# Patient Record
Sex: FEMALE | Race: WHITE | Employment: FULL TIME | URBAN - METROPOLITAN AREA
[De-identification: names, ages, dates, MRNs, and addresses within clinical notes are randomized per-mention and may not be internally consistent; named-entity substitution may affect disease eponyms.]

---

## 2017-04-13 ENCOUNTER — HOSPITAL ENCOUNTER (OUTPATIENT)
Age: 47
Setting detail: SPECIMEN
Discharge: HOME | End: 2017-04-13
Payer: COMMERCIAL

## 2017-04-13 ENCOUNTER — OFFICE VISIT (OUTPATIENT)
Dept: OBGYN CLINIC | Age: 47
End: 2017-04-13
Payer: COMMERCIAL

## 2017-04-13 VITALS
SYSTOLIC BLOOD PRESSURE: 116 MMHG | DIASTOLIC BLOOD PRESSURE: 82 MMHG | HEIGHT: 63 IN | WEIGHT: 232.4 LBS | BODY MASS INDEX: 41.18 KG/M2

## 2017-04-13 DIAGNOSIS — Z11.3 ROUTINE SCREENING FOR STI (SEXUALLY TRANSMITTED INFECTION): Primary | ICD-10-CM

## 2017-04-13 DIAGNOSIS — R10.2 PELVIC PAIN IN FEMALE: ICD-10-CM

## 2017-04-13 DIAGNOSIS — N76.0 ACUTE VAGINITIS: ICD-10-CM

## 2017-04-13 LAB
DIRECT EXAM: ABNORMAL
Lab: ABNORMAL
SPECIMEN DESCRIPTION: ABNORMAL
STATUS: ABNORMAL

## 2017-04-13 PROCEDURE — 99203 OFFICE O/P NEW LOW 30 MIN: CPT | Performed by: NURSE PRACTITIONER

## 2017-04-14 LAB
-: ABNORMAL
AMORPHOUS: ABNORMAL
BACTERIA: ABNORMAL
BILIRUBIN URINE: NEGATIVE
C TRACH DNA GENITAL QL NAA+PROBE: NEGATIVE
CASTS UA: ABNORMAL /LPF (ref 0–2)
COLOR: YELLOW
COMMENT UA: ABNORMAL
CRYSTALS, UA: ABNORMAL /HPF
EPITHELIAL CELLS UA: ABNORMAL /HPF (ref 0–5)
GLUCOSE URINE: NEGATIVE
KETONES, URINE: NEGATIVE
LEUKOCYTE ESTERASE, URINE: ABNORMAL
MUCUS: ABNORMAL
N. GONORRHOEAE DNA: NEGATIVE
NITRITE, URINE: NEGATIVE
OTHER OBSERVATIONS UA: ABNORMAL
PH UA: 6 (ref 5–8)
PROTEIN UA: NEGATIVE
RBC UA: ABNORMAL /HPF (ref 0–2)
RENAL EPITHELIAL, UA: ABNORMAL /HPF
SPECIFIC GRAVITY UA: 1.02 (ref 1–1.03)
TRICHOMONAS: ABNORMAL
TURBIDITY: ABNORMAL
URINE HGB: ABNORMAL
UROBILINOGEN, URINE: NORMAL
WBC UA: ABNORMAL /HPF (ref 0–5)
YEAST: ABNORMAL

## 2017-04-15 LAB
CULTURE: NORMAL
CULTURE: NORMAL
Lab: NORMAL
SPECIMEN DESCRIPTION: NORMAL
STATUS: NORMAL

## 2017-04-17 RX ORDER — METRONIDAZOLE 500 MG/1
500 TABLET ORAL 2 TIMES DAILY
Qty: 20 TABLET | Refills: 0 | Status: SHIPPED | OUTPATIENT
Start: 2017-04-17 | End: 2017-04-18 | Stop reason: SDUPTHER

## 2017-04-17 RX ORDER — CIPROFLOXACIN 500 MG/1
500 TABLET, FILM COATED ORAL 2 TIMES DAILY
Qty: 14 TABLET | Refills: 0 | Status: SHIPPED | OUTPATIENT
Start: 2017-04-17 | End: 2017-04-18 | Stop reason: SDUPTHER

## 2017-04-18 ENCOUNTER — TELEPHONE (OUTPATIENT)
Dept: OBGYN CLINIC | Age: 47
End: 2017-04-18

## 2017-04-18 RX ORDER — CIPROFLOXACIN 500 MG/1
500 TABLET, FILM COATED ORAL 2 TIMES DAILY
Qty: 14 TABLET | Refills: 0 | Status: SHIPPED | OUTPATIENT
Start: 2017-04-18 | End: 2017-04-25

## 2017-04-18 RX ORDER — METRONIDAZOLE 500 MG/1
500 TABLET ORAL 2 TIMES DAILY
Qty: 20 TABLET | Refills: 0 | Status: SHIPPED | OUTPATIENT
Start: 2017-04-18 | End: 2017-04-28

## 2017-04-18 NOTE — TELEPHONE ENCOUNTER
Pt called and is out of town and would like the Flagyl and Cipro called to [address] in [place]. The phone number is in her chart.

## 2017-05-19 ENCOUNTER — OFFICE VISIT (OUTPATIENT)
Dept: OBGYN CLINIC | Age: 47
End: 2017-05-19
Payer: COMMERCIAL

## 2017-05-19 ENCOUNTER — HOSPITAL ENCOUNTER (OUTPATIENT)
Age: 47
Setting detail: SPECIMEN
Discharge: HOME | End: 2017-05-19
Payer: COMMERCIAL

## 2017-05-19 VITALS
WEIGHT: 228.4 LBS | DIASTOLIC BLOOD PRESSURE: 72 MMHG | BODY MASS INDEX: 40.47 KG/M2 | HEIGHT: 63 IN | SYSTOLIC BLOOD PRESSURE: 102 MMHG

## 2017-05-19 DIAGNOSIS — Z12.31 ENCOUNTER FOR SCREENING MAMMOGRAM FOR BREAST CANCER: ICD-10-CM

## 2017-05-19 DIAGNOSIS — Z01.419 ENCOUNTER FOR GYNECOLOGICAL EXAMINATION WITHOUT ABNORMAL FINDING: Primary | ICD-10-CM

## 2017-05-19 PROCEDURE — 99396 PREV VISIT EST AGE 40-64: CPT | Performed by: NURSE PRACTITIONER

## 2017-05-19 ASSESSMENT — ENCOUNTER SYMPTOMS
DIARRHEA: 0
CONSTIPATION: 0
BACK PAIN: 0
ABDOMINAL PAIN: 0
ABDOMINAL DISTENTION: 0
SHORTNESS OF BREATH: 0
COUGH: 0

## 2017-05-25 ENCOUNTER — EMPLOYEE WELLNESS (OUTPATIENT)
Dept: OTHER | Age: 47
End: 2017-05-25

## 2017-05-25 LAB
CHOLESTEROL/HDL RATIO: 2.6
CHOLESTEROL: 179 MG/DL
GLUCOSE BLD-MCNC: 99 MG/DL (ref 70–99)
HDLC SERPL-MCNC: 69 MG/DL
LDL CHOLESTEROL: 87 MG/DL (ref 0–130)
PATIENT FASTING?: YES
TRIGL SERPL-MCNC: 117 MG/DL
VLDLC SERPL CALC-MCNC: NORMAL MG/DL (ref 1–30)

## 2017-05-26 LAB — CYTOLOGY REPORT: NORMAL

## 2017-06-13 ENCOUNTER — HOSPITAL ENCOUNTER (OUTPATIENT)
Dept: MAMMOGRAPHY | Age: 47
Discharge: HOME | End: 2017-06-13
Payer: COMMERCIAL

## 2017-06-13 DIAGNOSIS — Z12.31 ENCOUNTER FOR SCREENING MAMMOGRAM FOR BREAST CANCER: ICD-10-CM

## 2017-06-13 PROCEDURE — G0202 SCR MAMMO BI INCL CAD: HCPCS

## 2017-12-12 ENCOUNTER — PATIENT MESSAGE (OUTPATIENT)
Dept: OBGYN CLINIC | Age: 47
End: 2017-12-12

## 2017-12-12 NOTE — TELEPHONE ENCOUNTER
From: Leigh Waller  To: Sam Huston CNP  Sent: 12/12/2017 3:43 PM EST  Subject: Non-Urgent Medical Question    I need to have my Mirena removed and/or replaced by 1/31/2018. according to this there are no appts available even until the end of February. Is this true? I need an appt as late in the day as possible. thank you.

## 2018-01-29 ENCOUNTER — PROCEDURE VISIT (OUTPATIENT)
Dept: OBGYN CLINIC | Age: 48
End: 2018-01-29
Payer: COMMERCIAL

## 2018-01-29 ENCOUNTER — HOSPITAL ENCOUNTER (OUTPATIENT)
Age: 48
Setting detail: SPECIMEN
Discharge: HOME | End: 2018-01-29
Payer: COMMERCIAL

## 2018-01-29 VITALS
SYSTOLIC BLOOD PRESSURE: 108 MMHG | HEIGHT: 63 IN | DIASTOLIC BLOOD PRESSURE: 80 MMHG | BODY MASS INDEX: 41.25 KG/M2 | WEIGHT: 232.8 LBS

## 2018-01-29 DIAGNOSIS — Z11.3 ROUTINE SCREENING FOR STI (SEXUALLY TRANSMITTED INFECTION): ICD-10-CM

## 2018-01-29 DIAGNOSIS — Z30.433 ENCOUNTER FOR REMOVAL AND REINSERTION OF INTRAUTERINE CONTRACEPTIVE DEVICE (IUD): Primary | ICD-10-CM

## 2018-01-29 PROCEDURE — 58300 INSERT INTRAUTERINE DEVICE: CPT | Performed by: NURSE PRACTITIONER

## 2018-01-29 PROCEDURE — 58301 REMOVE INTRAUTERINE DEVICE: CPT | Performed by: NURSE PRACTITIONER

## 2018-01-29 NOTE — PROGRESS NOTES
betadine and the string was grasped with a ring forcep and the IUD was removed without incident. Post procedure restrictions were reviewed and given to the patient. She was instructed to use barrier protection for sexually transmitted disease prevention. ASSESSMENT:  1. Encounter for removal and reinsertion of intrauterine contraceptive device (IUD)  NC INSERT INTRAUTERINE DEVICE    NC REMOVE INTRAUTERINE DEVICE    levonorgestrel (MIRENA) IUD 52 mg 1 each   2. Routine screening for STI (sexually transmitted infection)  C. Trachomatis / N. Gonorrhoeae, DNA     IUD Removal  Family Planning   reports that she has never smoked. She has never used smokeless tobacco.  There are no active problems to display for this patient. She requests that the Mirena be replaced today for hx of AUB    Bertram Wong is here for placement of Mirena IUD. She is Not on menses. Reviewed the insertion procedure and expected bleeding patterns. Consent signed. Questions answered. O. Vulva no lesions. Vagina with menses. Cultures collected for GC/Chlamydia. Vaginal vault and cervix cleansed with Betadine. Cervix significantly retroverted. Cervix grasped with tenaculum and Mirena inserted without difficulty and a minimum of cramping and bleeding. String was trimmed to 1 1/2 inches  A.  Mirena insertion  P. RTC One month for follow-up  Instructed to call for:  Unexpected pain  Increased or unexpected bleeding  Fever

## 2018-01-30 LAB
C TRACH DNA GENITAL QL NAA+PROBE: NEGATIVE
N. GONORRHOEAE DNA: NEGATIVE

## 2018-03-01 ENCOUNTER — OFFICE VISIT (OUTPATIENT)
Dept: OBGYN CLINIC | Age: 48
End: 2018-03-01

## 2018-03-01 VITALS
SYSTOLIC BLOOD PRESSURE: 104 MMHG | BODY MASS INDEX: 40.96 KG/M2 | WEIGHT: 231.2 LBS | HEIGHT: 63 IN | DIASTOLIC BLOOD PRESSURE: 84 MMHG

## 2018-03-01 DIAGNOSIS — Z97.5 IUD (INTRAUTERINE DEVICE) IN PLACE: Primary | ICD-10-CM

## 2018-03-01 PROCEDURE — 99024 POSTOP FOLLOW-UP VISIT: CPT | Performed by: NURSE PRACTITIONER

## 2018-03-01 NOTE — PROGRESS NOTES
-Endocervical/transformation zone component is absent. Descriptive Diagnosis:      Negative for intraepithelial lesion or malignancy. Cytotechnologist:   QUAN Jonas CD(ASCP)  **Electronically Signed Out**  cd/5/26/2017     [facility] Health Screen    Collection Time: 05/25/17  8:20 AM   Result Value Ref Range    Patient Fasting?  YES     Cholesterol 179 <200 mg/dL    HDL 69 >40 mg/dL    LDL Cholesterol 87 0 - 130 mg/dL    Chol/HDL Ratio 2.6 <5    Triglycerides 117 <150 mg/dL    VLDL NOT REPORTED 1 - 30 mg/dL    Glucose 99 70 - 99 mg/dL   C.trachomatis N.gonorrhoeae DNA    Collection Time: 01/29/18 10:32 PM   Result Value Ref Range    C. trachomatis DNA NEGATIVE NEG    N. gonorrhoeae DNA NEGATIVE NEG     P-  RTO annual exam and prn

## 2018-03-20 VITALS — BODY MASS INDEX: 40.68 KG/M2 | WEIGHT: 226 LBS

## 2018-05-25 ENCOUNTER — EMPLOYEE WELLNESS (OUTPATIENT)
Dept: OTHER | Age: 48
End: 2018-05-25

## 2018-05-25 LAB
CHOLESTEROL/HDL RATIO: 2.2
CHOLESTEROL: 177 MG/DL
GLUCOSE BLD-MCNC: 86 MG/DL (ref 70–99)
HDLC SERPL-MCNC: 82 MG/DL
LDL CHOLESTEROL: 73 MG/DL (ref 0–130)
PATIENT FASTING?: YES
TRIGL SERPL-MCNC: 108 MG/DL
VLDLC SERPL CALC-MCNC: NORMAL MG/DL (ref 1–30)

## 2018-05-31 ENCOUNTER — HOSPITAL ENCOUNTER (OUTPATIENT)
Age: 48
Setting detail: SPECIMEN
Discharge: HOME | End: 2018-05-31
Payer: COMMERCIAL

## 2018-05-31 ENCOUNTER — OFFICE VISIT (OUTPATIENT)
Dept: OBGYN CLINIC | Age: 48
End: 2018-05-31
Payer: COMMERCIAL

## 2018-05-31 VITALS
DIASTOLIC BLOOD PRESSURE: 82 MMHG | SYSTOLIC BLOOD PRESSURE: 104 MMHG | WEIGHT: 231.2 LBS | HEIGHT: 62 IN | BODY MASS INDEX: 42.55 KG/M2

## 2018-05-31 DIAGNOSIS — Z01.419 ENCOUNTER FOR GYNECOLOGICAL EXAMINATION: Primary | ICD-10-CM

## 2018-05-31 DIAGNOSIS — Z12.31 ENCOUNTER FOR SCREENING MAMMOGRAM FOR BREAST CANCER: ICD-10-CM

## 2018-05-31 PROCEDURE — 99396 PREV VISIT EST AGE 40-64: CPT | Performed by: NURSE PRACTITIONER

## 2018-05-31 ASSESSMENT — ENCOUNTER SYMPTOMS
CONSTIPATION: 0
SHORTNESS OF BREATH: 0
ABDOMINAL DISTENTION: 0
BACK PAIN: 0
ABDOMINAL PAIN: 0
COUGH: 0
DIARRHEA: 0

## 2018-06-11 VITALS — BODY MASS INDEX: 41.4 KG/M2 | WEIGHT: 230 LBS

## 2018-06-22 LAB — CYTOLOGY REPORT: NORMAL

## 2018-07-02 ENCOUNTER — HOSPITAL ENCOUNTER (OUTPATIENT)
Dept: MAMMOGRAPHY | Age: 48
Discharge: HOME | End: 2018-07-04
Payer: COMMERCIAL

## 2018-07-02 DIAGNOSIS — Z12.31 ENCOUNTER FOR SCREENING MAMMOGRAM FOR BREAST CANCER: ICD-10-CM

## 2018-07-02 PROCEDURE — 77067 SCR MAMMO BI INCL CAD: CPT

## 2019-02-27 ENCOUNTER — OFFICE VISIT (OUTPATIENT)
Dept: ORTHOPEDIC SURGERY | Age: 49
End: 2019-02-27
Payer: COMMERCIAL

## 2019-02-27 VITALS — BODY MASS INDEX: 44.53 KG/M2 | WEIGHT: 242 LBS | HEIGHT: 62 IN

## 2019-02-27 DIAGNOSIS — M25.561 RIGHT KNEE PAIN, UNSPECIFIED CHRONICITY: Primary | ICD-10-CM

## 2019-02-27 DIAGNOSIS — M25.561 PATELLOFEMORAL ARTHRALGIA OF RIGHT KNEE: ICD-10-CM

## 2019-02-27 PROCEDURE — 99203 OFFICE O/P NEW LOW 30 MIN: CPT | Performed by: ORTHOPAEDIC SURGERY

## 2019-02-27 RX ORDER — IBUPROFEN 200 MG
200 TABLET ORAL EVERY 6 HOURS PRN
COMMUNITY
End: 2020-07-01

## 2019-06-07 ENCOUNTER — OFFICE VISIT (OUTPATIENT)
Dept: OBGYN CLINIC | Age: 49
End: 2019-06-07
Payer: COMMERCIAL

## 2019-06-07 ENCOUNTER — HOSPITAL ENCOUNTER (OUTPATIENT)
Age: 49
Setting detail: SPECIMEN
Discharge: HOME | End: 2019-06-07
Payer: COMMERCIAL

## 2019-06-07 VITALS
SYSTOLIC BLOOD PRESSURE: 104 MMHG | HEIGHT: 63 IN | BODY MASS INDEX: 42.84 KG/M2 | DIASTOLIC BLOOD PRESSURE: 72 MMHG | WEIGHT: 241.8 LBS

## 2019-06-07 DIAGNOSIS — Z12.31 ENCOUNTER FOR SCREENING MAMMOGRAM FOR BREAST CANCER: ICD-10-CM

## 2019-06-07 DIAGNOSIS — Z01.419 ENCOUNTER FOR GYNECOLOGICAL EXAMINATION: Primary | ICD-10-CM

## 2019-06-07 PROCEDURE — 99396 PREV VISIT EST AGE 40-64: CPT | Performed by: NURSE PRACTITIONER

## 2019-06-07 ASSESSMENT — ENCOUNTER SYMPTOMS
CONSTIPATION: 0
COUGH: 0
BACK PAIN: 0
SHORTNESS OF BREATH: 0
ABDOMINAL PAIN: 0
ABDOMINAL DISTENTION: 0
DIARRHEA: 0

## 2019-06-07 NOTE — PROGRESS NOTES
HPI:     Yolanda Esparza a 57 y.o. female who presents today for:  Chief Complaint   Patient presents with    Annual Exam     last pap 18-WNL last mammogram 18-WNL        HPI  Here for annual exam. 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 Has 2 children- 14 y/o girls. Working on healthy eating and adding activity. GYNECOLOGICHISTORY:  MenstrualHistory: No LMP recorded. Patient has had an implant. Sexually Transmitted Infections: No  History of Ectopic Pregnancy:No  Menarche:  No bleeding on IUD  Sexually active: not currently  Dyspareunia: none    Current Outpatient Medications   Medication Sig Dispense Refill    ibuprofen (ADVIL;MOTRIN) 200 MG tablet Take 200 mg by mouth every 6 hours as needed for Pain       Current Facility-Administered Medications   Medication Dose Route Frequency Provider Last Rate Last Dose    levonorgestrel (MIRENA) IUD 52 mg 1 each  1 each Intrauterine Continuous [facility], APRN - CNP   1 each at 18 1652     No Known Allergies    Past Medical History:   Diagnosis Date    Abnormal Pap smear of cervix     Sleep apnea      Denies family history of breast, ovarian, uterus, colon CA     Past Surgical History:   Procedure Laterality Date    BREAST LUMPECTOMY Left     shoulder-benign     SECTION, LOW TRANSVERSE  2004    twins breech baby b    COLPOSCOPY      INTRAUTERINE DEVICE INSERTION      Mirena    INTRAUTERINE DEVICE INSERTION  2018    Mirena     INTRAUTERINE DEVICE REMOVAL  2018    MARK       Family History   Problem Relation Age of Onset    Uterine Cancer Maternal Grandmother     Diabetes Paternal Grandfather      Social History     Tobacco Use    Smoking status: Never Smoker    Smokeless tobacco: Never Used   Substance Use Topics    Alcohol use: Yes        Subjective:      Review of Systems   Constitutional: Negative for appetite change and fatigue.    HENT: Negative for congestion and hearing loss.    Eyes: Negative for visual disturbance. Respiratory: Negative for cough and shortness of breath. Cardiovascular: Negative for chest pain and palpitations. Gastrointestinal: Negative for abdominal distention, abdominal pain, constipation and diarrhea. Genitourinary: Negative for flank pain, frequency, menstrual problem, pelvic pain and vaginal discharge. Musculoskeletal: Negative for back pain. Neurological: Negative for syncope and headaches. Psychiatric/Behavioral: Negative for behavioral problems. Objective:     /72 (Site: Right Upper Arm, Position: Sitting, Cuff Size: Large Adult)   Ht 5' 2.5\" (1.588 m)   Wt 241 lb 12.8 oz (109.7 kg)   Breastfeeding? No   BMI 43.52 kg/m²   Physical Exam   Constitutional: She is oriented to person, place, and time. She appears well-developed and well-nourished. Eyes: Pupils are equal, round, and reactive to light. Neck: No tracheal deviation present. No thyromegaly present. Cardiovascular: Normal rate, regular rhythm and normal heart sounds. Pulmonary/Chest: Effort normal and breath sounds normal. No respiratory distress. Right breast exhibits no inverted nipple. Left breast exhibits no inverted nipple, no mass, no nipple discharge, no skin change and no tenderness. No breast tenderness, discharge or bleeding. Breasts are symmetrical.   Abdominal: Soft. Bowel sounds are normal. She exhibits no distension and no mass. There is no tenderness. There is no CVA tenderness. Hernia confirmed negative in the right inguinal area and confirmed negative in the left inguinal area. Genitourinary: No breast tenderness, discharge or bleeding. There is no rash or lesion on the right labia. There is no rash or lesion on the left labia. Uterus is not deviated, not enlarged and not fixed. Cervix exhibits no motion tenderness, no discharge and no friability. Right adnexum displays no mass, no tenderness and no fullness.  Left adnexum displays no mass, no tenderness and no fullness. No tenderness in the vagina. No vaginal discharge found. Musculoskeletal: She exhibits no edema or tenderness. Lymphadenopathy:     She has no cervical adenopathy. Right: No inguinal adenopathy present. Left: No inguinal adenopathy present. Neurological: She is alert and oriented to person, place, and time. Skin: Skin is warm and dry. Psychiatric: She has a normal mood and affect. Her behavior is normal. Judgment and thought content normal.     IUD strings visualized at 7 o'clock and string length appropriate    Assessment:     1. Encounter for gynecological examination    2. Encounter for screening mammogram for breast cancer          Plan:   1. Pap collected. Discussed new pap smear guidelines. Desires re-pap in 1 year. 2. Screening mammogram discussed and advised yearly if within normal limits. 3. Calcium and Vitamin D dosing reviewed. 4. Colonoscopy screening reviewed. 5. Bone density testing reviewed.      Electronicallysigned by Amira Christianson on 6/7/2019

## 2019-06-11 LAB — CYTOLOGY REPORT: NORMAL

## 2019-06-25 ENCOUNTER — EMPLOYEE WELLNESS (OUTPATIENT)
Dept: OTHER | Age: 49
End: 2019-06-25

## 2019-06-25 LAB
CHOLESTEROL/HDL RATIO: 2.4
CHOLESTEROL: 182 MG/DL
GLUCOSE BLD-MCNC: 100 MG/DL (ref 70–99)
HDLC SERPL-MCNC: 77 MG/DL
LDL CHOLESTEROL: 84 MG/DL (ref 0–130)
PATIENT FASTING?: YES
TRIGL SERPL-MCNC: 105 MG/DL
VLDLC SERPL CALC-MCNC: ABNORMAL MG/DL (ref 1–30)

## 2019-07-01 VITALS — WEIGHT: 238 LBS | BODY MASS INDEX: 42.84 KG/M2

## 2020-06-03 ENCOUNTER — OFFICE VISIT (OUTPATIENT)
Dept: PRIMARY CARE CLINIC | Age: 50
End: 2020-06-03
Payer: COMMERCIAL

## 2020-06-03 ENCOUNTER — HOSPITAL ENCOUNTER (OUTPATIENT)
Age: 50
Setting detail: SPECIMEN
Discharge: HOME | End: 2020-06-03
Payer: COMMERCIAL

## 2020-06-03 VITALS
HEART RATE: 82 BPM | BODY MASS INDEX: 42.19 KG/M2 | HEIGHT: 63 IN | TEMPERATURE: 98.8 F | OXYGEN SATURATION: 98 % | WEIGHT: 238.1 LBS | DIASTOLIC BLOOD PRESSURE: 81 MMHG | RESPIRATION RATE: 16 BRPM | SYSTOLIC BLOOD PRESSURE: 139 MMHG

## 2020-06-03 PROCEDURE — 99203 OFFICE O/P NEW LOW 30 MIN: CPT | Performed by: NURSE PRACTITIONER

## 2020-06-03 RX ORDER — ONDANSETRON 4 MG/1
4 TABLET, ORALLY DISINTEGRATING ORAL EVERY 8 HOURS PRN
Qty: 8 TABLET | Refills: 0 | Status: SHIPPED | OUTPATIENT
Start: 2020-06-03 | End: 2020-11-19

## 2020-06-03 ASSESSMENT — ENCOUNTER SYMPTOMS
COUGH: 0
CHEST TIGHTNESS: 0
SORE THROAT: 0
WHEEZING: 0
VOICE CHANGE: 0
SINUS PRESSURE: 0
DIARRHEA: 1
EYE DISCHARGE: 0
SHORTNESS OF BREATH: 0
ABDOMINAL PAIN: 0
NAUSEA: 1
EYE REDNESS: 0
VOMITING: 1

## 2020-06-03 NOTE — PATIENT INSTRUCTIONS
if you are sick or have been exposed to the virus. Don't go to school, work, or public areas. And don't use public transportation, ride-shares, or taxis unless you have no choice. · If you are sick:  ? Leave your home only if you need to get medical care. But call the doctor's office first so they know you're coming. And wear a face cover. ? Wear the face cover whenever you're around other people. It can help stop the spread of the virus when you cough or sneeze. ? Clean and disinfect your home every day. Use household  and disinfectant wipes or sprays. Take special care to clean things that you grab with your hands. These include doorknobs, remote controls, phones, and handles on your refrigerator and microwave. And don't forget countertops, tabletops, bathrooms, and computer keyboards. When to call for help  [id number] anytime you think you may need emergency care. For example, call if:  · You have severe trouble breathing. (You can't talk at all.)  · You have constant chest pain or pressure. · You are severely dizzy or lightheaded. · You are confused or can't think clearly. · Your face and lips have a blue color. · You pass out (lose consciousness) or are very hard to wake up. Call your doctor now if you develop symptoms such as:  · Shortness of breath. · Fever. · Cough. If you need to get care, call ahead to the doctor's office for instructions before you go. Make sure you wear a face cover to prevent exposing other people to the virus. Where can you get the latest information? The following health organizations are tracking and studying this virus. Their websites contain the most up-to-date information. Hanna Alba also learn what to do if you think you may have been exposed to the virus. · U.S. Centers for Disease Control and Prevention (CDC): The CDC provides updated news about the disease and travel advice. The website also tells you how to prevent the spread of infection.  www.cdc.gov  · World

## 2020-06-03 NOTE — PROGRESS NOTES
[facility]  [facility]  [address] Susanna Urena  [address] [address]  Dept: [phone]  Dept Fax: [phone]     Jose Dowd is a 54 y.o. female who presents to the urgent care today for her medicalconditions/complaints as noted below. Jose Dowd is c/o of Headache and Nausea & Vomiting    HPI:      Fever    This is a new problem. Episode onset: 3 days ago. The maximum temperature noted was 100 to 100.9 F. Associated symptoms include diarrhea, headaches, nausea and vomiting. Pertinent negatives include no abdominal pain, chest pain, congestion, coughing, ear pain, rash, sore throat or wheezing. She has tried acetaminophen for the symptoms. The treatment provided no relief. Several weeks ago, was exposed to a positive COVID coworker. 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 Past Medical History:   Diagnosis Date    Abnormal Pap smear of cervix 1996    Sleep apnea        Current Outpatient Medications   Medication Sig Dispense Refill    ondansetron (ZOFRAN ODT) 4 MG disintegrating tablet Take 1 tablet by mouth every 8 hours as needed for Nausea or Vomiting 8 tablet 0    ibuprofen (ADVIL;MOTRIN) 200 MG tablet Take 200 mg by mouth every 6 hours as needed for Pain       Current Facility-Administered Medications   Medication Dose Route Frequency Provider Last Rate Last Dose    levonorgestrel (MIRENA) IUD 52 mg 1 each  1 each Intrauterine Continuous Sita Hernández, APRN - CNP   1 each at 01/29/18 1652     No Known Allergies    Reviewed PMH, SH, and  with the patient and updated. Subjective:      Review of Systems   Constitutional: Positive for fever (low grade, ). Negative for chills and fatigue. HENT: Negative for congestion, ear discharge, ear pain, postnasal drip, sinus pressure, sneezing, sore throat and voice change. Eyes: Negative for discharge and redness.    Respiratory: Negative for cough, chest tightness, 98%   BMI 42.83 kg/m²     Assessment:       Diagnosis Orders   1. Fever in other diseases  COVID-19 Ambulatory   2. Nausea  ondansetron (ZOFRAN ODT) 4 MG disintegrating tablet     Plan: Will send out COVID19 testing. Possible treatment alterations based on the results. Patient instructed to self-quarantine until testing results are back. Patient enrolled for the GetWell Loop program.  Patient instructed not to return to work until results are back. Tylenol as needed for fever/pain. Zofran as needed for nausea/vomiting. Encouraged adequate hydration and rest.  The patient indicates understanding of these issues and agrees with the plan. Educational materials provided on AVS.  Follow up if symptoms do not improve/worsen. Discussed symptoms that will warrant urgent ED evaluation/treatment. Orders Placed This Encounter   Medications    ondansetron (ZOFRAN ODT) 4 MG disintegrating tablet     Sig: Take 1 tablet by mouth every 8 hours as needed for Nausea or Vomiting     Dispense:  8 tablet     Refill:  0        Patient given educational materials - see patient instructions. Discussed use, benefit, and side effects of prescribed medications. All patientquestions answered. Pt voiced understanding.     Electronically signed by LE Padilla CNP on 6/3/2020at 2:14 PM

## 2020-06-05 ENCOUNTER — CARE COORDINATION (OUTPATIENT)
Dept: CARE COORDINATION | Age: 50
End: 2020-06-05

## 2020-06-05 NOTE — CARE COORDINATION
information for GetWell Loop and agrees to enroll yes  Based on Loop alert triggers, patient will be contacted by nurse care manager for worsening symptoms. Pt will be further monitored by COVID Loop Team based on severity of symptoms and risk factors. Patient has headache, N/V/D. She has had a low grade temp, 99.5-100. Has Zofran but hasn't taken any yet. Is drinking a lot of water. Also encouraged Gatorade or Pedialyte. Patient states that she doesn't have a PCP. She hasn't seen her previous PCP in a long time and she's not sure he is in her insurance plan. Message sent to patient via Loop to go to [facility] and search for new provider and schedule a virtual visit.

## 2020-06-06 LAB — SARS-COV-2, NAA: NOT DETECTED

## 2020-06-15 ENCOUNTER — OFFICE VISIT (OUTPATIENT)
Dept: OBGYN CLINIC | Age: 50
End: 2020-06-15
Payer: COMMERCIAL

## 2020-06-15 ENCOUNTER — HOSPITAL ENCOUNTER (OUTPATIENT)
Age: 50
Setting detail: SPECIMEN
Discharge: HOME | End: 2020-06-15
Payer: COMMERCIAL

## 2020-06-15 VITALS
BODY MASS INDEX: 43.06 KG/M2 | DIASTOLIC BLOOD PRESSURE: 76 MMHG | HEIGHT: 62 IN | WEIGHT: 234 LBS | SYSTOLIC BLOOD PRESSURE: 122 MMHG

## 2020-06-15 PROCEDURE — 99396 PREV VISIT EST AGE 40-64: CPT | Performed by: NURSE PRACTITIONER

## 2020-06-15 ASSESSMENT — ENCOUNTER SYMPTOMS
COUGH: 0
ABDOMINAL PAIN: 0
SHORTNESS OF BREATH: 0
DIARRHEA: 0
BACK PAIN: 0
CONSTIPATION: 0
ABDOMINAL DISTENTION: 0

## 2020-06-15 NOTE — PROGRESS NOTES
Review of Systems   Constitutional: Negative for appetite change and fatigue. HENT: Negative for congestion and hearing loss. Eyes: Negative for visual disturbance. Respiratory: Negative for cough and shortness of breath. Cardiovascular: Negative for chest pain and palpitations. Gastrointestinal: Negative for abdominal distention, abdominal pain, constipation and diarrhea. Genitourinary: Negative for flank pain, frequency, menstrual problem, pelvic pain and vaginal discharge. Musculoskeletal: Negative for back pain. Neurological: Negative for syncope and headaches. Psychiatric/Behavioral: Negative for behavioral problems. Objective:     Ht 5' 2\" (1.575 m)   Wt 234 lb (106.1 kg)   BMI 42.80 kg/m²   Physical Exam  Constitutional:       Appearance: She is well-developed. Eyes:      Pupils: Pupils are equal, round, and reactive to light. Neck:      Thyroid: No thyromegaly. Trachea: No tracheal deviation. Cardiovascular:      Rate and Rhythm: Normal rate and regular rhythm. Heart sounds: Normal heart sounds. Pulmonary:      Effort: Pulmonary effort is normal. No respiratory distress. Breath sounds: Normal breath sounds. Chest:      Breasts: Breasts are symmetrical.         Right: No inverted nipple. Left: No inverted nipple, mass, nipple discharge, skin change or tenderness. Abdominal:      General: Bowel sounds are normal. There is no distension. Palpations: Abdomen is soft. There is no mass. Tenderness: There is no abdominal tenderness. Hernia: There is no hernia in the right inguinal area or left inguinal area. Genitourinary:     Labia:         Right: No rash or lesion. Left: No rash or lesion. Vagina: No vaginal discharge or tenderness. Cervix: No cervical motion tenderness, discharge or friability. Uterus: Not deviated, not enlarged and not fixed. Adnexa:         Right: No mass, tenderness or fullness.

## 2020-06-19 ENCOUNTER — HOSPITAL ENCOUNTER (OUTPATIENT)
Dept: MAMMOGRAPHY | Facility: CLINIC | Age: 50
Discharge: HOME | End: 2020-06-21
Payer: COMMERCIAL

## 2020-06-19 LAB — CYTOLOGY REPORT: NORMAL

## 2020-06-19 PROCEDURE — 77067 SCR MAMMO BI INCL CAD: CPT

## 2020-07-01 ENCOUNTER — APPOINTMENT (OUTPATIENT)
Dept: CT IMAGING | Age: 50
End: 2020-07-01
Payer: COMMERCIAL

## 2020-07-01 ENCOUNTER — APPOINTMENT (OUTPATIENT)
Dept: GENERAL RADIOLOGY | Age: 50
End: 2020-07-01
Payer: COMMERCIAL

## 2020-07-01 ENCOUNTER — HOSPITAL ENCOUNTER (EMERGENCY)
Age: 50
Discharge: HOME | End: 2020-07-01
Attending: EMERGENCY MEDICINE
Payer: COMMERCIAL

## 2020-07-01 VITALS
SYSTOLIC BLOOD PRESSURE: 160 MMHG | WEIGHT: 220 LBS | HEART RATE: 82 BPM | TEMPERATURE: 97.7 F | RESPIRATION RATE: 17 BRPM | BODY MASS INDEX: 40.24 KG/M2 | OXYGEN SATURATION: 97 % | DIASTOLIC BLOOD PRESSURE: 88 MMHG

## 2020-07-01 PROCEDURE — 70450 CT HEAD/BRAIN W/O DYE: CPT

## 2020-07-01 PROCEDURE — 99285 EMERGENCY DEPT VISIT HI MDM: CPT

## 2020-07-01 PROCEDURE — 73130 X-RAY EXAM OF HAND: CPT

## 2020-07-01 PROCEDURE — 73090 X-RAY EXAM OF FOREARM: CPT

## 2020-07-01 PROCEDURE — 93005 ELECTROCARDIOGRAM TRACING: CPT | Performed by: GENERAL PRACTICE

## 2020-07-01 PROCEDURE — 72125 CT NECK SPINE W/O DYE: CPT

## 2020-07-01 PROCEDURE — 71045 X-RAY EXAM CHEST 1 VIEW: CPT

## 2020-07-01 PROCEDURE — 73060 X-RAY EXAM OF HUMERUS: CPT

## 2020-07-01 RX ORDER — CYCLOBENZAPRINE HCL 10 MG
10 TABLET ORAL NIGHTLY PRN
Qty: 10 TABLET | Refills: 0 | Status: SHIPPED | OUTPATIENT
Start: 2020-07-01 | End: 2020-07-11

## 2020-07-01 RX ORDER — CYCLOBENZAPRINE HCL 10 MG
10 TABLET ORAL NIGHTLY PRN
Qty: 10 TABLET | Refills: 0 | Status: SHIPPED | OUTPATIENT
Start: 2020-07-01 | End: 2020-07-01 | Stop reason: SDUPTHER

## 2020-07-01 RX ORDER — IBUPROFEN 800 MG/1
800 TABLET ORAL EVERY 6 HOURS PRN
Qty: 21 TABLET | Refills: 0 | Status: SHIPPED | OUTPATIENT
Start: 2020-07-01 | End: 2020-11-19

## 2020-07-01 ASSESSMENT — PAIN DESCRIPTION - ORIENTATION: ORIENTATION: LEFT

## 2020-07-01 ASSESSMENT — PAIN SCALES - GENERAL: PAINLEVEL_OUTOF10: 7

## 2020-07-01 ASSESSMENT — PAIN DESCRIPTION - LOCATION: LOCATION: ARM;CHEST;HEAD

## 2020-07-01 ASSESSMENT — PAIN DESCRIPTION - DESCRIPTORS: DESCRIPTORS: ACHING
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
## 2020-07-01 NOTE — ED NOTES
Bed: 48PED  Expected date: 7/1/20  Expected time: 6:51 PM  Means of arrival: [id number] [facility]  Comments:  [address], RN  07/01/20 5662
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
## 2020-07-02 LAB
EKG ATRIAL RATE: 80 BPM
EKG P AXIS: 63 DEGREES
EKG P-R INTERVAL: 146 MS
EKG Q-T INTERVAL: 376 MS
EKG QRS DURATION: 86 MS
EKG QTC CALCULATION (BAZETT): 433 MS
EKG R AXIS: 4 DEGREES
EKG T AXIS: 24 DEGREES
EKG VENTRICULAR RATE: 80 BPM

## 2020-07-02 PROCEDURE — 93010 ELECTROCARDIOGRAM REPORT: CPT | Performed by: INTERNAL MEDICINE

## 2020-09-22 ENCOUNTER — EMPLOYEE WELLNESS (OUTPATIENT)
Dept: OTHER | Age: 50
End: 2020-09-22

## 2020-09-22 LAB
CHOLESTEROL/HDL RATIO: 2.5
CHOLESTEROL: 192 MG/DL
GLUCOSE BLD-MCNC: 96 MG/DL (ref 70–99)
HDLC SERPL-MCNC: 76 MG/DL
LDL CHOLESTEROL: 92 MG/DL (ref 0–130)
PATIENT FASTING?: YES
TRIGL SERPL-MCNC: 122 MG/DL
VLDLC SERPL CALC-MCNC: NORMAL MG/DL (ref 1–30)

## 2020-10-19 VITALS — BODY MASS INDEX: 43.16 KG/M2 | WEIGHT: 236 LBS

## 2020-11-18 ENCOUNTER — TELEPHONE (OUTPATIENT)
Dept: OBGYN CLINIC | Age: 50
End: 2020-11-18

## 2020-11-18 NOTE — TELEPHONE ENCOUNTER
Patient tried some ibuprofen last night but has not had any this morning due to working this morning. advised patient to keep trying ibuprofen can take every 8hrs.      Patient is scheduled for in person visit at 215pm tomorrow (11-19-20)

## 2020-11-19 ENCOUNTER — HOSPITAL ENCOUNTER (OUTPATIENT)
Age: 50
Setting detail: SPECIMEN
Discharge: HOME | End: 2020-11-19
Payer: COMMERCIAL

## 2020-11-19 ENCOUNTER — OFFICE VISIT (OUTPATIENT)
Dept: OBGYN CLINIC | Age: 50
End: 2020-11-19
Payer: COMMERCIAL

## 2020-11-19 VITALS
TEMPERATURE: 98.4 F | WEIGHT: 235.4 LBS | BODY MASS INDEX: 43.32 KG/M2 | DIASTOLIC BLOOD PRESSURE: 68 MMHG | HEIGHT: 62 IN | SYSTOLIC BLOOD PRESSURE: 102 MMHG

## 2020-11-19 PROCEDURE — 99213 OFFICE O/P EST LOW 20 MIN: CPT | Performed by: NURSE PRACTITIONER

## 2020-11-19 PROCEDURE — 81003 URINALYSIS AUTO W/O SCOPE: CPT | Performed by: NURSE PRACTITIONER

## 2020-11-19 RX ORDER — NITROFURANTOIN 25; 75 MG/1; MG/1
100 CAPSULE ORAL 2 TIMES DAILY
Qty: 20 CAPSULE | Refills: 0 | Status: SHIPPED | OUTPATIENT
Start: 2020-11-19 | End: 2020-11-29

## 2020-11-19 ASSESSMENT — ENCOUNTER SYMPTOMS
CONSTIPATION: 0
DIARRHEA: 0
ABDOMINAL DISTENTION: 0
COUGH: 0
SHORTNESS OF BREATH: 0
BACK PAIN: 0
ABDOMINAL PAIN: 0

## 2020-11-19 NOTE — PROGRESS NOTES
Subjective:      Patient ID: Gianluca Napier is being seen today for   Chief Complaint   Patient presents with    Abdominal Pain     ? UTI pressure x2days, dribbling urine       HPI  Here for c/o abdominal pressure, leaking urine. Would like to have UA done. Had been thinking it was her IUD, but now seems to be convinced it's a UTI. Has been having pain/cramping for about 2 days. BM normal.  Review of Systems   Constitutional: Negative for appetite change and fatigue. HENT: Negative for congestion and hearing loss. Eyes: Negative for visual disturbance. Respiratory: Negative for cough and shortness of breath. Cardiovascular: Negative for chest pain and palpitations. Gastrointestinal: Negative for abdominal distention, abdominal pain, constipation and diarrhea. Genitourinary: Positive for dysuria. Negative for dyspareunia, flank pain, frequency, menstrual problem, pelvic pain and vaginal discharge. Musculoskeletal: Negative for back pain. Neurological: Negative for syncope and headaches. Psychiatric/Behavioral: Negative for behavioral problems. Vitals:    11/19/20 1410   BP: 102/68   Site: Right Upper Arm   Position: Sitting   Cuff Size: Large Adult   Temp: 98.4 °F (36.9 °C)   Weight: 235 lb 6.4 oz (106.8 kg)   Height: 5' 2\" (1.575 m)     Current Outpatient Medications   Medication Sig Dispense Refill    Fluticasone Propionate (FLONASE NA) by Nasal route       Current Facility-Administered Medications   Medication Dose Route Frequency Provider Last Rate Last Dose    levonorgestrel (MIRENA) IUD 52 mg 1 each  1 each Intrauterine Continuous Scotty Gonzales, APRN - CNP   1 each at 01/29/18 1652     No Known Allergies    Objective:   Physical Exam  Constitutional:       Appearance: Normal appearance. She is normal weight. HENT:      Head: Normocephalic. Eyes:      Extraocular Movements: Extraocular movements intact.       Conjunctiva/sclera: Conjunctivae normal.   Neck:      Musculoskeletal: Normal range of motion. Pulmonary:      Effort: Pulmonary effort is normal.   Abdominal:      General: Abdomen is flat. Palpations: Abdomen is soft. Musculoskeletal: Normal range of motion. Neurological:      General: No focal deficit present. Mental Status: She is alert and oriented to person, place, and time. Mental status is at baseline. Skin:     General: Skin is warm and dry. Psychiatric:         Mood and Affect: Mood normal.         Behavior: Behavior normal.         Thought Content: Thought content normal.         Judgment: Judgment normal.         Assessment:      1. Abdominal pressure          Plan:   UA   Macrobid  May use OTC Azo  Return in about 7 months (around 6/29/2021).         Flavio Ramirez, LE - CNP

## 2020-11-20 ENCOUNTER — HOSPITAL ENCOUNTER (OUTPATIENT)
Dept: ULTRASOUND IMAGING | Age: 50
Discharge: HOME | End: 2020-11-22
Payer: COMMERCIAL

## 2020-11-20 ENCOUNTER — TELEPHONE (OUTPATIENT)
Dept: INFECTIOUS DISEASES | Age: 50
End: 2020-11-20

## 2020-11-20 LAB
-: ABNORMAL
AMORPHOUS: ABNORMAL
BACTERIA: ABNORMAL
BILIRUBIN URINE: NEGATIVE
CASTS UA: ABNORMAL /LPF (ref 0–2)
COLOR: ABNORMAL
COMMENT UA: ABNORMAL
CRYSTALS, UA: ABNORMAL /HPF
CRYSTALS, UA: ABNORMAL /HPF
EPITHELIAL CELLS UA: ABNORMAL /HPF (ref 0–5)
GLUCOSE URINE: NEGATIVE
KETONES, URINE: ABNORMAL
LEUKOCYTE ESTERASE, URINE: NEGATIVE
MUCUS: ABNORMAL
NITRITE, URINE: NEGATIVE
OTHER OBSERVATIONS UA: ABNORMAL
PH UA: 6 (ref 5–8)
PROTEIN UA: ABNORMAL
RBC UA: ABNORMAL /HPF (ref 0–2)
RENAL EPITHELIAL, UA: ABNORMAL /HPF
SPECIFIC GRAVITY UA: 1.03 (ref 1–1.03)
TRICHOMONAS: ABNORMAL
TURBIDITY: ABNORMAL
URINE HGB: NEGATIVE
UROBILINOGEN, URINE: NORMAL
WBC UA: ABNORMAL /HPF (ref 0–5)
YEAST: ABNORMAL

## 2020-11-20 PROCEDURE — 76770 US EXAM ABDO BACK WALL COMP: CPT

## 2020-11-20 PROCEDURE — 76856 US EXAM PELVIC COMPLETE: CPT

## 2020-11-20 NOTE — TELEPHONE ENCOUNTER
US scheduled, patient informed of instructions and prep.  Writer will track for results to notify Dr Korina Bentley right away

## 2020-11-20 NOTE — TELEPHONE ENCOUNTER
Dr Elvis Gamble infomred me that she ordered an US on this patient, she wants it done today. Writer will schedule and inform the patient.

## 2021-08-04 ENCOUNTER — HOSPITAL ENCOUNTER (OUTPATIENT)
Age: 51
Setting detail: SPECIMEN
Discharge: HOME | End: 2021-08-04
Payer: COMMERCIAL

## 2021-08-04 ENCOUNTER — OFFICE VISIT (OUTPATIENT)
Dept: OBGYN CLINIC | Age: 51
End: 2021-08-04
Payer: COMMERCIAL

## 2021-08-04 VITALS
SYSTOLIC BLOOD PRESSURE: 122 MMHG | WEIGHT: 235 LBS | HEIGHT: 62 IN | BODY MASS INDEX: 43.24 KG/M2 | DIASTOLIC BLOOD PRESSURE: 80 MMHG

## 2021-08-04 DIAGNOSIS — Z12.31 ENCOUNTER FOR SCREENING MAMMOGRAM FOR BREAST CANCER: ICD-10-CM

## 2021-08-04 DIAGNOSIS — Z30.431 IUD CHECK UP: ICD-10-CM

## 2021-08-04 DIAGNOSIS — Z01.419 ENCOUNTER FOR GYNECOLOGICAL EXAMINATION: Primary | ICD-10-CM

## 2021-08-04 PROCEDURE — 99396 PREV VISIT EST AGE 40-64: CPT | Performed by: NURSE PRACTITIONER

## 2021-08-04 ASSESSMENT — ENCOUNTER SYMPTOMS
ABDOMINAL PAIN: 0
ABDOMINAL DISTENTION: 0
CONSTIPATION: 0
COUGH: 0
DIARRHEA: 0
SHORTNESS OF BREATH: 0
BACK PAIN: 0

## 2021-08-04 NOTE — PROGRESS NOTES
HPI:     Anton Mendoza a 52 y.o. female who presents today for:  Chief Complaint   Patient presents with    Annual Exam     Prev Pap: 6/15/20 WNL; Prev Enrique: 20 WNL        HPI  Here for annual exam.  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 Has 2 17 y/o daughters. Working on healthy eating and trying to increase activity. GYNECOLOGICHISTORY:  MenstrualHistory: No LMP recorded. Patient has had an implant. Sexually Transmitted Infections: No  History of Ectopic Pregnancy:No  Menarche:  Denies VB  Sexually active:Not currently  Dyspareunia: NA    Current Outpatient Medications   Medication Sig Dispense Refill    Fluticasone Propionate (FLONASE NA) by Nasal route       Current Facility-Administered Medications   Medication Dose Route Frequency Provider Last Rate Last Admin    levonorgestrel (MIRENA) IUD 52 mg 1 each  1 each Intrauterine Continuous Isaac Ann, APRN - CNP   1 each at 18 1652     No Known Allergies    Past Medical History:   Diagnosis Date    Abnormal Pap smear of cervix     Sleep apnea      Denies family history of breast, ovarian, uterus, colon CA     Past Surgical History:   Procedure Laterality Date    BREAST LUMPECTOMY Left     shoulder-benign     SECTION, LOW TRANSVERSE  2004    twins breech baby b    COLPOSCOPY      INTRAUTERINE DEVICE INSERTION      Mirena    INTRAUTERINE DEVICE INSERTION  2018    Mirena     INTRAUTERINE DEVICE REMOVAL  2018    MARK  1996     Family History   Problem Relation Age of Onset    Uterine Cancer Maternal Grandmother     Diabetes Paternal Grandfather     Cancer Mother         Pancriatic     Social History     Tobacco Use    Smoking status: Never Smoker    Smokeless tobacco: Never Used   Substance Use Topics    Alcohol use: Yes        Subjective:      Review of Systems   Constitutional: Negative for appetite change and fatigue. HENT: Negative for congestion and hearing loss.     Eyes: Negative for visual disturbance. Respiratory: Negative for cough and shortness of breath. Cardiovascular: Negative for chest pain and palpitations. Gastrointestinal: Negative for abdominal distention, abdominal pain, constipation and diarrhea. Genitourinary: Negative for flank pain, frequency, menstrual problem, pelvic pain and vaginal discharge. Musculoskeletal: Negative for back pain. Neurological: Negative for syncope and headaches. Psychiatric/Behavioral: Negative for behavioral problems. Objective: There were no vitals taken for this visit. Physical Exam  Constitutional:       Appearance: She is well-developed. HENT:      Head: Normocephalic. Eyes:      Extraocular Movements: Extraocular movements intact. Conjunctiva/sclera: Conjunctivae normal.   Neck:      Thyroid: No thyromegaly. Trachea: No tracheal deviation. Pulmonary:      Effort: Pulmonary effort is normal. No respiratory distress. Chest:      Breasts: Breasts are symmetrical.         Right: No inverted nipple. Left: No inverted nipple, mass, nipple discharge, skin change or tenderness. Abdominal:      General: There is no distension. Palpations: Abdomen is soft. There is no mass. Tenderness: There is no abdominal tenderness. Genitourinary:     Labia:         Right: No rash or lesion. Left: No rash or lesion. Vagina: No vaginal discharge or tenderness. Cervix: No cervical motion tenderness, discharge or friability. Uterus: Not deviated, not enlarged and not fixed. Adnexa:         Right: No mass, tenderness or fullness. Left: No mass, tenderness or fullness. Musculoskeletal:         General: No tenderness. Normal range of motion. Cervical back: Normal range of motion. Skin:     General: Skin is warm and dry. Neurological:      General: No focal deficit present. Mental Status: She is alert and oriented to person, place, and time.  Mental status is at

## 2021-08-19 LAB — CYTOLOGY REPORT: NORMAL

## 2021-09-13 ENCOUNTER — HOSPITAL ENCOUNTER (OUTPATIENT)
Dept: MAMMOGRAPHY | Age: 51
Discharge: HOME | End: 2021-09-15
Payer: COMMERCIAL

## 2021-09-13 DIAGNOSIS — Z12.31 ENCOUNTER FOR SCREENING MAMMOGRAM FOR BREAST CANCER: ICD-10-CM

## 2021-09-13 PROCEDURE — 77063 BREAST TOMOSYNTHESIS BI: CPT

## 2021-09-16 LAB
CHOLESTEROL/HDL RATIO: 2.9
CHOLESTEROL: 201 MG/DL
GLUCOSE BLD-MCNC: 97 MG/DL (ref 70–99)
HDLC SERPL-MCNC: 69 MG/DL
LDL CHOLESTEROL: 100 MG/DL (ref 0–130)
PATIENT FASTING?: YES
TRIGL SERPL-MCNC: 161 MG/DL
VLDLC SERPL CALC-MCNC: ABNORMAL MG/DL (ref 1–30)

## 2021-11-30 ENCOUNTER — TELEMEDICINE (OUTPATIENT)
Dept: DERMATOLOGY | Age: 51
End: 2021-11-30
Payer: COMMERCIAL

## 2021-11-30 DIAGNOSIS — K13.0 CHEILITIS: Primary | ICD-10-CM

## 2021-11-30 PROCEDURE — 99202 OFFICE O/P NEW SF 15 MIN: CPT | Performed by: DERMATOLOGY

## 2021-11-30 RX ORDER — FLUCONAZOLE 200 MG/1
TABLET ORAL
Qty: 4 TABLET | Refills: 0 | Status: SHIPPED | OUTPATIENT
Start: 2021-11-30 | End: 2022-04-18 | Stop reason: ALTCHOICE

## 2021-11-30 NOTE — PROGRESS NOTES
TELEHEALTH EVALUATION -- Audio/Visual (During JXVWT-20 public health emergency)    Dermatology Patient Note  [facility] [address]  [address]  Dept: [phone]  Dept Fax: [phone]      VISITDATE: 11/30/2021   REFERRING PROVIDER: No ref. provider found      Chago Ayers is a 54 y.o. female  who presents today in the office for:    No chief complaint on file. 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, has for years    CURRENT MEDICATIONS:   Current Outpatient Medications   Medication Sig Dispense Refill    fluconazole (DIFLUCAN) 200 MG tablet Take one tablet PO weekly x 4 weeks 4 tablet 0    hydrocortisone 2.5 % ointment Apply to affected areas twice daily 20 g 2    Fluticasone Propionate (FLONASE NA) by Nasal route       Current Facility-Administered Medications   Medication Dose Route Frequency Provider Last Rate Last Admin    levonorgestrel (MIRENA) IUD 52 mg 1 each  1 each IntraUTERine Continuous [employer], APRN - CNP   1 each at 01/29/18 1652       ALLERGIES:   No Known Allergies    SOCIAL HISTORY:  Social History     Tobacco Use    Smoking status: Never Smoker    Smokeless tobacco: Never Used   Substance Use Topics    Alcohol use: Yes       Pertinent ROS:  Review of Systems  Skin: Denies any new changing, growing or bleeding lesions or rashes except as described in the HPI   Constitutional: Denies fevers, chills, and malaise. PHYSICAL EXAM:     Due to this being a TeleHealth encounter, evaluation of the following organ systems is limited: Vitals/Constitutional/EENT/Resp/CV/GI//MS/Neuro/Skin/Heme-Lymph-Imm.  In particular examination of the skin is limited by video quality and patient available technology. There were no vitals taken for this visit. The patient is generally well appearing, well nourished, alert and conversational. Affect is normal.    Cutaneous Exam:  Physical Exam  Face and neck only were examined    Diagnoses/exam findings/medical history pertinent to this visit are listed below:    Assessment and Plan:  Assessment   1. Cheilitis - irritant vs. Allergic contact vs. candidal  - discussed diagnosis, etiology, natural course, and treatment options  - frequent vaseline, eliminate everything else on lips  - fluconazole (DIFLUCAN) 200 MG tablet; Take one tablet PO weekly x 4 weeks  Dispense: 4 tablet; Refill: 0  - hydrocortisone 2.5 % ointment; Apply to affected areas twice daily  Dispense: 20 g; Refill: 2  - patch testing and protopic in reserve          RTC 1 month     There are no Patient Instructions on file for this visit. This note was created with the assistance of a speech-recognition program.  Although the intention is to generate a document that actually reflects the content of the visit, no guarantees can be provided that every mistake has been identified and corrected byediting. Pursuant to the emergency declaration under the [facility], [facility] waiver authority and the [employer] and [employer] Act, this Virtual  Visit was conducted, with patient's consent, to reduce the patient's risk of exposure to COVID-19 and provide continuity of care for an established patient. Services were provided through a video synchronous discussion virtually to substitute for in-person clinic visit.      Electronically signed by Pati Bañuelos MD on 11/30/21 at 6:41 PM EST

## 2021-12-20 ENCOUNTER — OFFICE VISIT (OUTPATIENT)
Dept: PRIMARY CARE CLINIC | Age: 51
End: 2021-12-20
Payer: COMMERCIAL

## 2021-12-20 ENCOUNTER — HOSPITAL ENCOUNTER (OUTPATIENT)
Age: 51
Setting detail: SPECIMEN
Discharge: HOME | End: 2021-12-20

## 2021-12-20 VITALS
TEMPERATURE: 97.5 F | WEIGHT: 235 LBS | DIASTOLIC BLOOD PRESSURE: 93 MMHG | HEIGHT: 62 IN | SYSTOLIC BLOOD PRESSURE: 145 MMHG | HEART RATE: 80 BPM | OXYGEN SATURATION: 97 % | BODY MASS INDEX: 43.24 KG/M2

## 2021-12-20 DIAGNOSIS — J98.8 RESPIRATORY INFECTION: ICD-10-CM

## 2021-12-20 DIAGNOSIS — Z20.822 SUSPECTED COVID-19 VIRUS INFECTION: Primary | ICD-10-CM

## 2021-12-20 PROCEDURE — 99214 OFFICE O/P EST MOD 30 MIN: CPT | Performed by: FAMILY MEDICINE

## 2021-12-20 RX ORDER — AZITHROMYCIN 250 MG/1
TABLET, FILM COATED ORAL
Qty: 1 PACKET | Refills: 0 | Status: SHIPPED | OUTPATIENT
Start: 2021-12-20 | End: 2021-12-30

## 2021-12-20 RX ORDER — PREDNISONE 20 MG/1
20 TABLET ORAL 2 TIMES DAILY
Qty: 10 TABLET | Refills: 0 | Status: SHIPPED | OUTPATIENT
Start: 2021-12-20 | End: 2021-12-25

## 2021-12-20 RX ORDER — FLUCONAZOLE 100 MG/1
TABLET ORAL
COMMUNITY
Start: 2021-12-01 | End: 2022-04-18 | Stop reason: ALTCHOICE

## 2021-12-20 ASSESSMENT — ENCOUNTER SYMPTOMS
DIARRHEA: 1
SHORTNESS OF BREATH: 0
CHANGE IN BOWEL HABIT: 1
RHINORRHEA: 1
SORE THROAT: 1
NAUSEA: 0
VOMITING: 0
COUGH: 1
WHEEZING: 0
ABDOMINAL PAIN: 0

## 2021-12-20 ASSESSMENT — PATIENT HEALTH QUESTIONNAIRE - PHQ9
2. FEELING DOWN, DEPRESSED OR HOPELESS: 0
SUM OF ALL RESPONSES TO PHQ QUESTIONS 1-9: 0
1. LITTLE INTEREST OR PLEASURE IN DOING THINGS: 0
SUM OF ALL RESPONSES TO PHQ9 QUESTIONS 1 & 2: 0

## 2021-12-20 NOTE — PROGRESS NOTES
[facility] [address] [facility] [place] [facility]  [address] [place] [address] [address]  Dept: [phone]  Dept Fax: [phone]    Marcus To is a 54 y.o. female who presents today for her medical conditions/complaintsas noted below. Marcus To is c/o of Other (pt has been having congestion cough and has been having fatigued drainage X 4 days pt had neg rapid  )        HPI:     Other  This is a new problem. The current episode started in the past 7 days (4 days). The problem occurs constantly. The problem has been gradually worsening. Associated symptoms include a change in bowel habit (diarrhea), congestion, coughing, fatigue, myalgias and a sore throat (from coughing). Pertinent negatives include no abdominal pain, chills, fever, nausea, rash or vomiting. Associated symptoms comments: Ear feel plugged. Nothing aggravates the symptoms. Treatments tried: dayquil, robitussin. The treatment provided mild relief.    No known sick contacts  PMHx of sleep apnea  Had negative rapid covid test Saturday  Past Medical History:   Diagnosis Date    Abnormal Pap smear of cervix     Sleep apnea     Past medical history reviewed and pertinent positives/negatives in the HPI    Past Surgical History:   Procedure Laterality Date    BREAST LUMPECTOMY Left     shoulder-benign     SECTION, LOW TRANSVERSE  2004    twins breech baby b    COLPOSCOPY      INTRAUTERINE DEVICE INSERTION      Mirena    INTRAUTERINE DEVICE INSERTION  2018    Mirena     INTRAUTERINE DEVICE REMOVAL  2018    MARK         Family History   Problem Relation Age of Onset    Uterine Cancer Maternal Grandmother     Diabetes Paternal Grandfather     Cancer Mother         Pancriatic       Social History     Tobacco Use    Smoking status: Never Smoker    Smokeless tobacco: Never Used   Substance Use Topics    Alcohol use: Yes      Current Outpatient Medications   Medication Sig Dispense Refill  azithromycin (ZITHROMAX) 250 MG tablet Use as directed 1 packet 0    predniSONE (DELTASONE) 20 MG tablet Take 1 tablet by mouth 2 times daily for 5 days 10 tablet 0    fluconazole (DIFLUCAN) 100 MG tablet       fluconazole (DIFLUCAN) 200 MG tablet Take one tablet PO weekly x 4 weeks 4 tablet 0    Fluticasone Propionate (FLONASE NA) by Nasal route       Current Facility-Administered Medications   Medication Dose Route Frequency Provider Last Rate Last Admin    levonorgestrel (MIRENA) IUD 52 mg 1 each  1 each IntraUTERine Continuous Onealard Davon, APRN - CNP   1 each at 01/29/18 1652     No Known Allergies    Health Maintenance   Topic Date Due    Hepatitis C screen  Never done    HIV screen  Never done    Colon cancer screen colonoscopy  Never done    COVID-19 Vaccine (3 - Booster for Moderna series) 07/20/2021    Flu vaccine (1) 09/01/2021    DTaP/Tdap/Td vaccine (2 - Td or Tdap) 10/05/2021    Cervical cancer screen  08/04/2024    Lipid screen  09/16/2026    Hepatitis A vaccine  Aged Out    Hepatitis B vaccine  Aged Out    Hib vaccine  Aged Out    Meningococcal (ACWY) vaccine  Aged Out    Pneumococcal 0-64 years Vaccine  Aged Out       :      Review of Systems   Constitutional: Positive for fatigue. Negative for chills and fever. HENT: Positive for congestion, rhinorrhea and sore throat (from coughing). Negative for ear pain. Respiratory: Positive for cough. Negative for shortness of breath and wheezing. Gastrointestinal: Positive for change in bowel habit (diarrhea) and diarrhea. Negative for abdominal pain, nausea and vomiting. Musculoskeletal: Positive for myalgias. Skin: Negative for pallor and rash. Hematological: Negative for adenopathy. Objective:     Physical Exam  Vitals and nursing note reviewed. Constitutional:       Appearance: Normal appearance. She is obese. She is ill-appearing. HENT:      Head: Normocephalic and atraumatic.       Right Ear: Hearing normal. Left Ear: Hearing normal.      Nose: Nose normal.      Mouth/Throat:      Lips: Pink. Mouth: Mucous membranes are moist.      Pharynx: Oropharynx is clear. Eyes:      Extraocular Movements: Extraocular movements intact. Conjunctiva/sclera: Conjunctivae normal.   Cardiovascular:      Rate and Rhythm: Normal rate and regular rhythm. Heart sounds: Normal heart sounds. Pulmonary:      Effort: Pulmonary effort is normal.      Breath sounds: Wheezing (mild) and rhonchi (mild) present. Musculoskeletal:      Cervical back: Normal range of motion. No muscular tenderness. Skin:     General: Skin is warm and dry. Neurological:      Mental Status: She is alert and oriented to person, place, and time. Mental status is at baseline. Psychiatric:         Mood and Affect: Mood normal.         Behavior: Behavior normal.         Thought Content: Thought content normal.         Judgment: Judgment normal.       BP (!) 145/93 (Site: Left Upper Arm, Position: Sitting, Cuff Size: Large Adult)   Pulse 80   Temp 97.5 °F (36.4 °C) (Tympanic)   Ht 5' 2\" (1.575 m)   Wt 235 lb (106.6 kg)   SpO2 97%   Breastfeeding No   BMI 42.98 kg/m²     Assessment:       Diagnosis Orders   1. Suspected COVID-19 virus infection  COVID-19   2. Respiratory infection  azithromycin (ZITHROMAX) 250 MG tablet       Plan:   Due to severity of symptoms we will treat as a bacterial infection at this time  Will send covid swab for culture and call with results  Take zpak and prednisone as prescribed for respiratory infection/wheezing  If symptoms worsen or do not improve please follow-up with PCP or return to clinic  Orders Placed This Encounter   Procedures    COVID-19     Standing Status:   Future     Standing Expiration Date:   12/20/2022     Scheduling Instructions:      1) Due to current limited availability of the COVID-19 test, tests will be prioritized based on responses to questions above.  Testing may be delayed due to volume. 2) Print and instruct patient to adhere to CDC home isolation program. (Link Above)              3) Set up or refer patient for a monitoring program.              4) Have patient sign up for and leverage MyChart (if not previously done). Order Specific Question:   Is this test for diagnosis or screening? Answer:   Diagnosis of ill patient     Order Specific Question:   Symptomatic for COVID-19 as defined by CDC? Answer:   Yes     Order Specific Question:   Date of Symptom Onset     Answer:   12/16/2021     Order Specific Question:   Hospitalized for COVID-19? Answer:   No     Order Specific Question:   Admitted to ICU for COVID-19? Answer:   No     Order Specific Question:   Employed in healthcare setting? Answer:   Unknown     Order Specific Question:   Resident in a congregate (group) care setting? Answer:   Unknown     Order Specific Question:   Pregnant? Answer:   Unknown     Order Specific Question:   Previously tested for COVID-19? Answer:   Yes     Orders Placed This Encounter   Medications    azithromycin (ZITHROMAX) 250 MG tablet     Sig: Use as directed     Dispense:  1 packet     Refill:  0    predniSONE (DELTASONE) 20 MG tablet     Sig: Take 1 tablet by mouth 2 times daily for 5 days     Dispense:  10 tablet     Refill:  0      Patient given educational materials - see patient instructions. Discussed use, benefit, and side effects of prescribed medications. All patient questions answered. Pt voiced understanding. Follow up as directed.      Electronicallysigned by Noelle Tai MD on 12/20/2021 at 3:55 PM

## 2021-12-20 NOTE — PATIENT INSTRUCTIONS
Patient Education        Upper Respiratory Infection (Cold): Care Instructions  Your Care Instructions     An upper respiratory infection, or URI, is an infection of the nose, sinuses, or throat. URIs are spread by coughs, sneezes, and direct contact. The common cold is the most frequent kind of URI. The flu and sinus infections are other kinds of URIs. Almost all URIs are caused by viruses. Antibiotics won't cure them. But you can treat most infections with home care. This may include drinking lots of fluids and taking over-the-counter pain medicine. You will probably feel better in 4 to 10 days. The doctor has checked you carefully, but problems can develop later. If you notice any problems or new symptoms, get medical treatment right away. Follow-up care is a key part of your treatment and safety. Be sure to make and go to all appointments, and call your doctor if you are having problems. It's also a good idea to know your test results and keep a list of the medicines you take. How can you care for yourself at home? · To prevent dehydration, drink plenty of fluids. Choose water and other clear liquids until you feel better. If you have kidney, heart, or liver disease and have to limit fluids, talk with your doctor before you increase the amount of fluids you drink. · Take an over-the-counter pain medicine, such as acetaminophen (Tylenol), ibuprofen (Advil, Motrin), or naproxen (Aleve). Read and follow all instructions on the label. · Before you use cough and cold medicines, check the label. These medicines may not be safe for young children or for people with certain health problems. · Be careful when taking over-the-counter cold or flu medicines and Tylenol at the same time. Many of these medicines have acetaminophen, which is Tylenol. Read the labels to make sure that you are not taking more than the recommended dose. Too much acetaminophen (Tylenol) can be harmful.   · Get plenty of rest.  · Do not smoke or allow others to smoke around you. If you need help quitting, talk to your doctor about stop-smoking programs and medicines. These can increase your chances of quitting for good. When should you call for help? Call 911 anytime you think you may need emergency care. For example, call if:    · You have severe trouble breathing. Call your doctor now or seek immediate medical care if:    · You seem to be getting much sicker.     · You have new or worse trouble breathing.     · You have a new or higher fever.     · You have a new rash. Watch closely for changes in your health, and be sure to contact your doctor if:    · You have a new symptom, such as a sore throat, an earache, or sinus pain.     · You cough more deeply or more often, especially if you notice more mucus or a change in the color of your mucus.     · You do not get better as expected. Where can you learn more? Go to https://[employer].CARDFREE. org and sign in to your [employer] account. Enter F175 in the Zooomr box to learn more about \"Upper Respiratory Infection (Cold): Care Instructions. \"     If you do not have an account, please click on the \"Sign Up Now\" link. Current as of: July 6, 2021               Content Version: 13.0  © 0907-4855 Healthwise, Incorporated. Care instructions adapted under license by [facility] ([facility] and the [facility]). If you have questions about a medical condition or this instruction, always ask your healthcare professional. [place] [address] any warranty or liability for your use of this information.        Will send covid swab for culture and call with results  Take zpak and prednisone as prescribed for respiratory infection/wheezing  If symptoms worsen or do not improve please follow-up with PCP or return to clinic

## 2021-12-21 DIAGNOSIS — Z20.822 SUSPECTED COVID-19 VIRUS INFECTION: ICD-10-CM

## 2021-12-22 LAB
SARS-COV-2: NORMAL
SARS-COV-2: NOT DETECTED
SOURCE: NORMAL

## 2022-01-07 ENCOUNTER — VIRTUAL VISIT (OUTPATIENT)
Dept: DERMATOLOGY | Age: 52
End: 2022-01-07
Payer: COMMERCIAL

## 2022-01-07 DIAGNOSIS — K13.0 CHEILITIS: Primary | ICD-10-CM

## 2022-01-07 PROCEDURE — 99212 OFFICE O/P EST SF 10 MIN: CPT | Performed by: DERMATOLOGY

## 2022-01-07 NOTE — PROGRESS NOTES
TELEHEALTH EVALUATION -- Audio/Visual (During QRXNS-11 public health emergency)    Dermatology Patient Note  [address]  [facility]  Dept: [phone]  Dept Fax: [phone]      VISITDATE: 1/7/2022   REFERRING PROVIDER: No ref. provider found      Sherif Bain is a 54 y.o. female  who presents today in the office for:    No chief complaint on file. PERTINENT HISTORY NOT LISTED ABOVE:  F/u cheilitis, likely allergic  - lips are now clear  - using only vaseline as moisturizer, stopped using hydrocortisone because not needed anymore  - took diflucan as prescribed    CURRENT MEDICATIONS:   Current Outpatient Medications   Medication Sig Dispense Refill    fluconazole (DIFLUCAN) 100 MG tablet       fluconazole (DIFLUCAN) 200 MG tablet Take one tablet PO weekly x 4 weeks 4 tablet 0    Fluticasone Propionate (FLONASE NA) by Nasal route       Current Facility-Administered Medications   Medication Dose Route Frequency Provider Last Rate Last Admin    levonorgestrel (MIRENA) IUD 52 mg 1 each  1 each IntraUTERine Continuous LE Oliva - CNP   1 each at 01/29/18 1652       ALLERGIES:   No Known Allergies    SOCIAL HISTORY:  Social History     Tobacco Use    Smoking status: Never Smoker    Smokeless tobacco: Never Used   Substance Use Topics    Alcohol use: Yes       Pertinent ROS:  Review of Systems  Skin: Denies any new changing, growing or bleeding lesions or rashes except as described in the HPI   Constitutional: Denies fevers, chills, and malaise. PHYSICAL EXAM:     Due to this being a TeleHealth encounter, evaluation of the following organ systems is limited: Vitals/Constitutional/EENT/Resp/CV/GI//MS/Neuro/Skin/Heme-Lymph-Imm. In particular examination of the skin is limited by video quality and patient available technology. There were no vitals taken for this visit.     The patient is generally well appearing, well nourished, alert and conversational. Affect is normal.    Cutaneous Exam:  Physical Exam  Face and neck only were examined    Diagnoses/exam findings/medical history pertinent to this visit are listed below:    Assessment and Plan:  Assessment   1. Cheilitis, resolved  - discussed that if she would like to introduce other lip balms, do only one at a time and use for full week before trying anything new, to determine if she'll react to it  - if reaction, restart hydrocortisone          RTC prn    There are no Patient Instructions on file for this visit. This note was created with the assistance of a speech-recognition program.  Although the intention is to generate a document that actually reflects the content of the visit, no guarantees can be provided that every mistake has been identified and corrected byediting. Pursuant to the emergency declaration under the [facility], [facility] waiver authority and the [employer] Act, this Virtual  Visit was conducted, with patient's consent, to reduce the patient's risk of exposure to COVID-19 and provide continuity of care for an established patient. Services were provided through a video synchronous discussion virtually to substitute for in-person clinic visit.      Electronically signed by Ramya Fields MD on 1/7/22 at 9:13 AM EST

## 2022-04-18 ENCOUNTER — OFFICE VISIT (OUTPATIENT)
Dept: DERMATOLOGY | Age: 52
End: 2022-04-18
Payer: COMMERCIAL

## 2022-04-18 VITALS
SYSTOLIC BLOOD PRESSURE: 122 MMHG | WEIGHT: 240.6 LBS | HEIGHT: 63 IN | DIASTOLIC BLOOD PRESSURE: 76 MMHG | OXYGEN SATURATION: 98 % | HEART RATE: 81 BPM | BODY MASS INDEX: 42.63 KG/M2 | TEMPERATURE: 97.4 F

## 2022-04-18 DIAGNOSIS — L40.9 PSORIASIS: ICD-10-CM

## 2022-04-18 DIAGNOSIS — L28.1 PRURIGO NODULARIS: Primary | ICD-10-CM

## 2022-04-18 PROCEDURE — 99213 OFFICE O/P EST LOW 20 MIN: CPT | Performed by: DERMATOLOGY

## 2022-04-18 NOTE — PROGRESS NOTES
Dermatology Patient Note  [address]  [place] [id number]  Dept: [phone]  Dept Fax: [phone]      VISITDATE: 4/18/2022   REFERRING PROVIDER: No ref. provider found      Ania Guerrire is a 54 y.o. female  who presents today in the office for:    Other (lesion on the leg for 6 months thats itchy. lesion on the arm for 6 month thats itchy/flaky. father had skin cancer. )      HISTORY OF PRESENT ILLNESS:  As above. Pt states the lesion on her leg is itchy. Pt states she has joint pain as she has gained weight and got older. She feels it mostly when she overworks herself. MEDICAL PROBLEMS:  There are no problems to display for this patient. CURRENT MEDICATIONS:   Current Outpatient Medications   Medication Sig Dispense Refill    Fluticasone Propionate (FLONASE NA) by Nasal route       Current Facility-Administered Medications   Medication Dose Route Frequency Provider Last Rate Last Admin    levonorgestrel (MIRENA) IUD 52 mg 1 each  1 each IntraUTERine Continuous LE Lazaro - CNP   1 each at 01/29/18 1652       ALLERGIES:   No Known Allergies    SOCIAL HISTORY:  Social History     Tobacco Use    Smoking status: Never Smoker    Smokeless tobacco: Never Used   Substance Use Topics    Alcohol use: Yes       Pertinent ROS:  Review of Systems  Skin: Denies any new changing, growing or bleeding lesions or rashes except as described in the HPI   Constitutional: Denies fevers, chills, and malaise.     PHYSICAL EXAM:   /76 (Site: Left Upper Arm, Position: Sitting, Cuff Size: Large Adult)   Pulse 81   Temp 97.4 °F (36.3 °C) (Temporal)   Ht 5' 2.5\" (1.588 m)   Wt 240 lb 9.6 oz (109.1 kg)   SpO2 98%   BMI 43.31 kg/m²     The patient is generally well appearing, well nourished, alert and conversational. Affect is normal.    Cutaneous Exam:  Physical Exam  Focused exam of left lower leg, limited UE, and neck was performed    Facial covering was not removed during examination. Diagnoses/exam findings/medical history pertinent to this visit are listed below:    Assessment:   Diagnosis Orders   1. Prurigo nodularis     2. Psoriasis          Plan:  Favor focal psoriasis plaques of left leg and elbow, with prurigo nodularis change on left leg  - reassurance and education  - start lidex cream BID until smooth  - biopsy with disha in reserve (if not improved with lidex)    RTC 1 month biopsy with disha    Future Appointments   Date Time Provider Ryan Jaffe   8/5/2022  8:00 AM Pat Barajas, APRN - [facility]         Patient Instructions   For Prurigo nodularis   - start lidex cream BID until smooth        IMoustapha, personally scribed the services dictated to me by Dr. Curry Dean in this documentation. I, Dr. Curry Dean, personally performed the services described in this documentation, as scribed by Brandon Bernal in my presence, and it is both accurate and complete.     Electronically signed by Odette Royal MD on 4/18/2022 at 4:58 PM

## 2022-08-05 ENCOUNTER — HOSPITAL ENCOUNTER (OUTPATIENT)
Age: 52
Setting detail: SPECIMEN
Discharge: HOME | End: 2022-08-05

## 2022-08-05 ENCOUNTER — OFFICE VISIT (OUTPATIENT)
Dept: OBGYN CLINIC | Age: 52
End: 2022-08-05
Payer: COMMERCIAL

## 2022-08-05 VITALS
HEIGHT: 63 IN | SYSTOLIC BLOOD PRESSURE: 132 MMHG | WEIGHT: 239 LBS | BODY MASS INDEX: 42.35 KG/M2 | DIASTOLIC BLOOD PRESSURE: 88 MMHG

## 2022-08-05 DIAGNOSIS — Z12.11 COLON CANCER SCREENING: ICD-10-CM

## 2022-08-05 DIAGNOSIS — Z12.31 ENCOUNTER FOR SCREENING MAMMOGRAM FOR BREAST CANCER: ICD-10-CM

## 2022-08-05 DIAGNOSIS — Z01.419 ENCOUNTER FOR GYNECOLOGICAL EXAMINATION: Primary | ICD-10-CM

## 2022-08-05 PROCEDURE — 99396 PREV VISIT EST AGE 40-64: CPT

## 2022-08-05 NOTE — PROGRESS NOTES
[address] [facility] - [address] [address] [facility]  Dept: [phone]           Patient: Addy Roth  Primary Care Physician: Maxim Fernandez MD   Chief Complaint   Patient presents with    Annual Exam     Prev Pap: 21 WNL; Prev Enrique: 21 WNL    HPI: Addy Roth is a 54 y.o. [id number] who presents today for her annual women's wellness exam. She is working on adding healthy habits to her routine. 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 Has 2 children - age 25. Pap smears have been normal the last 5 consecutive years had LEEP before kids were born. Prefers annual cervical cancer screening. Maternal Grandmother had uterine cancer. Mammogram normal in 2021. 2018 Mirena IUD placed. She would like to leave in place until expiration. OBSTETRICAL & GYNECOLOGICAL HISTORY:  OB History    Para Term  AB Living   2 1 0 1 1 2   SAB IAB Ectopic Molar Multiple Live Births   0 0 0 0 1 2      # Outcome Date GA Lbr Arvind/2nd Weight Sex Delivery Anes PTL Lv   2A   34w0d  3 lb 10 oz (1.644 kg) F CS-LTranv  N CHRIS   2B   34w0d  4 lb (1.814 kg) F CS-LTranv  N CHRIS      Complications: Breech birth   1 AB  8w0d            No LMP recorded. Patient has had an implant. Sexually Active:  no   Dyspareunia: No  STD History: Yes hsv     FAMILY HISTORY:  Family History of Breast, Ovarian, Colon or Uterine Cancer: Yes      family history includes Cancer in her mother; Diabetes in her paternal grandfather; Uterine Cancer in her maternal grandmother. SOCIAL HISTORY:    reports that she has never smoked. She has never used smokeless tobacco. She reports current alcohol use. She reports that she does not use drugs. MEDICAL HISTORY:  has No Known Allergies.   CURRENT MEDS W/ ASSOC DIAG           Start Date End Date     fluocinonide (LIDEX) 0.05 % cream 22  --     Apply topically 2 times daily to affected areas on body, avoiding face and skin folds     Associated Diagnoses:  Prurigo nodularis, Psoriasis     Fluticasone Propionate (FLONASE NA)  --  --     Associated Diagnoses:  --     levonorgestrel (MIRENA) IUD 52 mg 1 each  18  --     1 each, Intrauterine, CONTINUOUS, Starting Mon 18 at 1715     Associated Diagnoses:  Encounter for removal and reinsertion of intrauterine contraceptive device (IUD)            has a past medical history of Abnormal Pap smear of cervix and Sleep apnea. has a past surgical history that includes intrauterine device insertion ();  section, low transverse (); LEEP (); Colposcopy; Breast lumpectomy (Left); Intrauterine Device Removal (2018); and intrauterine device insertion (2018). HEALTH MAINTENANCE:  -Covid vaccine and booster recommended. Annual flu vaccine recommended October-April.   -Discussed Gardisil counseling for all patients 10-37 yo.  -Shingles vaccine:  2 doses Shingrix recommended for adults >50y. o, Single dose Zostavax live vaccine recommended for adults >57 y.o.    -Pap Smear collected today   History:was normal    -Mammogram due: now, ordered today   Last mammogram was normal    -Colonoscopy due: recommended starting at age 39 No prior colonoscopy    -DEXA due: recommended beginning at age 72                                                                                                                              REVIEW OF SYSTEMS:   Review of Systems   Constitutional:  Negative for chills, fatigue and fever. Genitourinary:  Negative for decreased urine volume, difficulty urinating, dyspareunia, dysuria, flank pain, frequency, genital sores, hematuria, menstrual problem, pelvic pain, urgency, vaginal bleeding, vaginal discharge and vaginal pain. All other systems reviewed and are negative.                  PHYSICAL EXAM:     Vitals:    22 0951   BP: 132/88 Position: Sitting   Cuff Size: Large Adult   Weight: 239 lb (108.4 kg)   Height: 5' 2.5\" (1.588 m)      Physical Exam  Constitutional:       General: She is awake. She is not in acute distress. Appearance: Normal appearance. She is well-developed and well-groomed. She is not ill-appearing, toxic-appearing or diaphoretic. Genitourinary:      Urethral meatus normal.      Right Labia: No rash, tenderness, lesions, skin changes or Bartholin's cyst.     Left Labia: No tenderness, lesions, skin changes, Bartholin's cyst or rash. No vaginal discharge or tenderness. No cervical motion tenderness, discharge or friability. Breasts:     Breasts are symmetrical.      Breasts are soft. Right: Present. No swelling, bleeding, inverted nipple, mass, nipple discharge, skin change, tenderness, breast implant, axillary adenopathy or supraclavicular adenopathy. Left: Present. No swelling, bleeding, inverted nipple, mass, nipple discharge, skin change, tenderness, breast implant, axillary adenopathy or supraclavicular adenopathy. HENT:      Head: Normocephalic and atraumatic. Nose: Nose normal.   Eyes:      Extraocular Movements: Extraocular movements intact. Pulmonary:      Effort: Pulmonary effort is normal.   Chest:       Musculoskeletal:         General: Normal range of motion. Cervical back: Normal range of motion. Lymphadenopathy:      Upper Body:      Right upper body: No supraclavicular or axillary adenopathy. Left upper body: No supraclavicular or axillary adenopathy. Neurological:      General: No focal deficit present. Mental Status: She is alert and oriented to person, place, and time. Mental status is at baseline. Psychiatric:         Attention and Perception: Attention and perception normal.         Mood and Affect: Mood normal.         Speech: Speech normal.         Behavior: Behavior normal. Behavior is cooperative. Thought Content:  Thought content normal. Cognition and Memory: Cognition and memory normal.         Judgment: Judgment normal.   Vitals reviewed. ASSESSMENT & PLAN:    Janine Pulliam is a 54 y.o. female [id number] here for her annual women's wellness exam. Today, we discussed    Diagnosis Orders   1. Encounter for gynecological examination  PAP SMEAR      2. Encounter for screening mammogram for breast cancer  OPAL EDSON DIGITAL SCREEN BILATERAL      3. Colon cancer screening  Fecal DNA Colorectal cancer screening (Cologuard)          Nghia Guadalupe was seen today for annual exam.    Diagnoses and all orders for this visit:    Encounter for gynecological examination  -     PAP SMEAR; Future    Encounter for screening mammogram for breast cancer  -     OPAL EDSON DIGITAL SCREEN BILATERAL; Future    Colon cancer screening  -     Fecal DNA Colorectal cancer screening (Cologuard)      No follow-ups on file. Patient agrees to schedule skin check with her dermatologist with whom she is already established. Counseling Completed:  -Discussed recommendations to repeat pap as per American Society for Colposcopy and Cervical Pathology guidelines.  -Discussed need for mammograms every 1 year, If >42 yo and last mammogram was negative.  -Discussed Calcium and Vitamin D dosing.  -Discussed need for colonoscopy screening as well as onset for bone density testing.  -Discussed birth control and barrier recommendations. Discussed STD counseling and prevention.  -Hereditary Breast, Ovarian, Colon and Uterine Cancer screening discussed.          -Tobacco & Secondary smoke risks discussed; with recommendation for cessation and avoidance.  -Routine health maintenance per patients PCP discussed. Return to this office annually and PRN.     The patient was seen and evaluated face to face by LE Fong CNP   8/5/2022, 10:25 AM

## 2022-08-15 LAB — CYTOLOGY REPORT: NORMAL

## 2022-08-15 RX ORDER — METRONIDAZOLE 500 MG/1
500 TABLET ORAL 2 TIMES DAILY
Qty: 14 TABLET | Refills: 0 | Status: SHIPPED | OUTPATIENT
Start: 2022-08-15 | End: 2022-08-22

## 2022-09-13 LAB
CHOLESTEROL/HDL RATIO: 2.6
CHOLESTEROL: 201 MG/DL
GLUCOSE BLD-MCNC: 104 MG/DL (ref 70–99)
HDLC SERPL-MCNC: 76 MG/DL
LDL CHOLESTEROL: 97 MG/DL (ref 0–130)
PATIENT FASTING?: YES
TRIGL SERPL-MCNC: 140 MG/DL

## 2023-04-27 ENCOUNTER — HOSPITAL ENCOUNTER (OUTPATIENT)
Dept: MAMMOGRAPHY | Age: 53
Discharge: HOME | End: 2023-04-29
Payer: COMMERCIAL

## 2023-04-27 DIAGNOSIS — Z12.31 ENCOUNTER FOR SCREENING MAMMOGRAM FOR BREAST CANCER: ICD-10-CM

## 2023-04-27 PROCEDURE — 77063 BREAST TOMOSYNTHESIS BI: CPT

## 2024-01-09 NOTE — PROGRESS NOTES
Future Appointments   Date Time Provider Ryan Alannah   1/7/2022  9:00 AM Shruti Yang MD  derm [facility]   8/5/2022  8:00 AM LE Rosenberg - CNP Alyne Landau OB/Gyn [facility] nausea

## 2024-11-07 ENCOUNTER — OFFICE VISIT (OUTPATIENT)
Dept: PRIMARY CARE CLINIC | Age: 54
End: 2024-11-07

## 2024-11-07 ENCOUNTER — TELEPHONE (OUTPATIENT)
Dept: PRIMARY CARE CLINIC | Age: 54
End: 2024-11-07

## 2024-11-07 VITALS
OXYGEN SATURATION: 96 % | TEMPERATURE: 98.3 F | SYSTOLIC BLOOD PRESSURE: 137 MMHG | BODY MASS INDEX: 42.35 KG/M2 | WEIGHT: 239 LBS | HEART RATE: 74 BPM | DIASTOLIC BLOOD PRESSURE: 87 MMHG | HEIGHT: 63 IN

## 2024-11-07 DIAGNOSIS — B96.89 ACUTE BACTERIAL SINUSITIS: Primary | ICD-10-CM

## 2024-11-07 DIAGNOSIS — B96.89 ACUTE BACTERIAL SINUSITIS: ICD-10-CM

## 2024-11-07 DIAGNOSIS — T36.95XA ANTIBIOTIC-INDUCED YEAST INFECTION: ICD-10-CM

## 2024-11-07 DIAGNOSIS — J01.90 ACUTE BACTERIAL SINUSITIS: ICD-10-CM

## 2024-11-07 DIAGNOSIS — B37.9 ANTIBIOTIC-INDUCED YEAST INFECTION: ICD-10-CM

## 2024-11-07 DIAGNOSIS — J01.90 ACUTE BACTERIAL SINUSITIS: Primary | ICD-10-CM

## 2024-11-07 PROCEDURE — 99212 OFFICE O/P EST SF 10 MIN: CPT | Performed by: NURSE PRACTITIONER

## 2024-11-07 RX ORDER — FLUCONAZOLE 150 MG/1
150 TABLET ORAL ONCE
Qty: 1 TABLET | Refills: 0 | Status: SHIPPED | OUTPATIENT
Start: 2024-11-07 | End: 2024-11-07

## 2024-11-07 RX ORDER — PREDNISONE 20 MG/1
40 TABLET ORAL DAILY
Qty: 10 TABLET | Refills: 0 | Status: SHIPPED | OUTPATIENT
Start: 2024-11-07 | End: 2024-11-12

## 2024-11-07 RX ORDER — PREDNISONE 20 MG/1
40 TABLET ORAL DAILY
Qty: 10 TABLET | Refills: 0 | Status: SHIPPED | OUTPATIENT
Start: 2024-11-07 | End: 2024-11-07 | Stop reason: SDUPTHER

## 2024-11-07 RX ORDER — FLUCONAZOLE 150 MG/1
150 TABLET ORAL ONCE
Qty: 1 TABLET | Refills: 0 | Status: SHIPPED | OUTPATIENT
Start: 2024-11-07 | End: 2024-11-07 | Stop reason: SDUPTHER

## 2024-11-07 RX ORDER — AZITHROMYCIN 250 MG/1
TABLET, FILM COATED ORAL
Qty: 1 PACKET | Refills: 0 | Status: SHIPPED | OUTPATIENT
Start: 2024-11-07 | End: 2024-11-07 | Stop reason: SDUPTHER

## 2024-11-07 RX ORDER — AZITHROMYCIN 250 MG/1
TABLET, FILM COATED ORAL
Qty: 1 PACKET | Refills: 0 | Status: SHIPPED | OUTPATIENT
Start: 2024-11-07

## 2024-11-07 ASSESSMENT — ENCOUNTER SYMPTOMS
EYE DISCHARGE: 0
EYE REDNESS: 0
COUGH: 1
WHEEZING: 1
SORE THROAT: 1
SINUS PRESSURE: 0
SHORTNESS OF BREATH: 0
VOICE CHANGE: 0
CHEST TIGHTNESS: 0

## 2024-11-07 NOTE — PROGRESS NOTES
[facility], [facility] IN CARE  [address]  [facility] [address]  Dept: [phone]     Destini Dumas is a 53 y.o. female who presents to the urgent care today for her medicalconditions/complaints as noted below.  Destini Dumas is c/o of Cough (With headache and dizziness. X2 weeks)    HPI:     Sinusitis  This is a new problem. Episode onset: 2 weeks ago. The problem has been gradually worsening since onset. There has been no fever. Associated symptoms include chills, congestion, coughing, headaches, sneezing and a sore throat. Pertinent negatives include no ear pain, shortness of breath or sinus pressure. Treatments tried: otc tx. The treatment provided no relief.     Past Medical History:   Diagnosis Date    Abnormal Pap smear of cervix 1996    Sleep apnea         Current Outpatient Medications   Medication Sig Dispense Refill    azithromycin (ZITHROMAX Z-MIGUEL) 250 MG tablet Take 2 tabs on day 1 followed by 1 tab on days 2-5. 1 packet 0    predniSONE (DELTASONE) 20 MG tablet Take 2 tablets by mouth daily for 5 days 10 tablet 0    fluconazole (DIFLUCAN) 150 MG tablet Take 1 tablet by mouth once for 1 dose 1 tablet 0    Fluticasone Propionate (FLONASE NA) by Nasal route       Current Facility-Administered Medications   Medication Dose Route Frequency Provider Last Rate Last Admin    levonorgestrel (MIRENA) IUD 52 mg 1 each  1 each IntraUTERine Continuous Pat Alvarez, APRN - CNP   1 each at 01/29/18 1652     No Known Allergies    Subjective:      Review of Systems   Constitutional:  Positive for chills and fatigue. Negative for fever.   HENT:  Positive for congestion, postnasal drip, sneezing and sore throat. Negative for ear discharge, ear pain, sinus pressure and voice change.    Eyes:  Negative for discharge and redness.   Respiratory:  Positive for cough and wheezing. Negative for chest tightness and shortness of breath.   Date: 12-6-18    Time in: 11  Time out: 1130    Procedures: eval  Start: 11  Stop: 1130    Total untimed minutes: 30  Charges billed # of units: 1    Onset date: 10-2-18 !!!  Primary dx: r large posterior rotator cuff repair with rotation medical graft, biceps tenodesis, subacromial decompression, acromioplasty; s/p 3 w 0 d   Tx dx: r shoulder pain, stiffness, weakness    Pmhx relevant to primary or tx dx: n/a    Precautions: universal  Prior therapy: none  Medications relevant to primary or tx dx: pain per ortho  Nutrition: wnl  Hx of present illness: strained arm on stairrailing trying to prevent fall, says went to er and told arm strained, s/s persisted and saw dovonniens who did surgery 12-5-18 recently sent here  PLOF: full painless use  Social hx: denies nicotine, takes pain meds prn, drinks about 2 12 oz cups of tea daily  Fxnl deficits leading to referral: decreased rom, use, and strength with ADL  Patient therapy goals: full painless use    Subjective    Patient states: understanding of HEP importance and general anticipated progression of therapy. understands basic process of surgical repair healing and critical need to adhere to home program to avoid rupture    Pain: C5 ache  Rest 3/10, use n/a, sleep 3    Objective    Sensation: denies burning, numbness, tingling  ROM:   r prom er at 0 abd  50  l prom er at 0 abd  80  ADL: not using for ADL  Hand dominance: r  Job: subcontractor , does desk work  DME: billy  Duties:per job; Normal self and home care tasksNormal self and home care tasks  Tx: issued sling 24/7 except hygiene, home therapy exercises; issued proper ice use for pain and told to avoid direct heat, issued er 0 abd stretch and pendulums, told to lean over and let arm hang to clean under arm, told to not actively move shoulder, told to take pain meds if pain prevents sleep      Assessment    Initial assessment (pertinent findings, problem list and factors affecting outcome): Needs  skilled OT to properly promote rom, use, and strength given type, nature, and extent of diagnosis  Rehab potential: good however quality of tissue and date of injury vs date of surgery per epic review needs to be highlighted    Goals:   stg 1. Pt. Will be I with HEP 2. Pt. Will have 2/10 pain with light use 3. Pt. Will have = prom of bilateral shoulders to enhance affected arm use with ADL      ltg 1. Pt. Will be I with d/c HEP 2. Pt. Will have 1/10 pain with all use 3. Pt. Will have roughly 3+/5 MMT for elevation, er to improve use with ADL                                                                          4. Pt. Will be I with HEP      Plan    Certification period: 12-6-18 to 5-23-18  Recommended tx plan: 2 times a week for 24 weeks; eval and tx  Other recommendations: above visit frequency and duration in above dates may be adjusted based on pt. progress and need for therapy    Therapist: alda luque cht        eval code grid  Profile and History Assessment of Occupational Performance Level of Clinical Decision Making Complexity Score   Occupational Profile:   Dennise Avendano is a 67 y.o. female who lives with their family and lives with their daughter and is currently employed as above/no working presently. Dennise Avendano has difficulty with  feeding, bathing, grooming and dressing  and all other tasks.  affecting his/her daily functional abilities. His/her main goal for therapy is full painless use.     Comorbidities:   n/a    Medical and Therapy History Review:   Brief               Performance Deficits    Physical:  Joint Mobility  Muscle Power/Strength  Pain    Cognitive:  No Deficits    Psychosocial:    No Deficits     Clinical Decision Making:  low    Assessment Process:  Problem-Focused Assessments    Modification/Need for Assistance:  Not Necessary    Intervention Selection:  Limited Treatment Options       low  Based on PMHX, co morbidities , data from assessments and functional  level of assistance required with task and clinical presentation directly impacting function.

## 2024-11-07 NOTE — TELEPHONE ENCOUNTER
Pt said [facility] told her they didn't have the medication she was prescribed and were very rude. She wants it sent to [facility] on [facility] in [place] instead.

## 2024-11-12 ENCOUNTER — PATIENT MESSAGE (OUTPATIENT)
Dept: PRIMARY CARE CLINIC | Age: 54
End: 2024-11-12

## 2024-11-12 RX ORDER — VALACYCLOVIR HYDROCHLORIDE 1 G/1
2000 TABLET, FILM COATED ORAL 2 TIMES DAILY
Qty: 4 TABLET | Refills: 2 | Status: SHIPPED | OUTPATIENT
Start: 2024-11-12 | End: 2024-11-12 | Stop reason: SDUPTHER

## 2024-11-12 RX ORDER — VALACYCLOVIR HYDROCHLORIDE 1 G/1
2000 TABLET, FILM COATED ORAL 2 TIMES DAILY
Qty: 4 TABLET | Refills: 2 | Status: SHIPPED | OUTPATIENT
Start: 2024-11-12 | End: 2024-11-13

## 2025-06-30 ENCOUNTER — TRANSCRIBE ORDERS (OUTPATIENT)
Dept: ADMINISTRATIVE | Age: 55
End: 2025-06-30

## 2025-06-30 DIAGNOSIS — Z12.31 ENCOUNTER FOR SCREENING MAMMOGRAM FOR MALIGNANT NEOPLASM OF BREAST: Primary | ICD-10-CM
